# Patient Record
Sex: MALE | Race: WHITE | NOT HISPANIC OR LATINO | Employment: UNEMPLOYED | URBAN - METROPOLITAN AREA
[De-identification: names, ages, dates, MRNs, and addresses within clinical notes are randomized per-mention and may not be internally consistent; named-entity substitution may affect disease eponyms.]

---

## 2017-03-25 ENCOUNTER — HOSPITAL ENCOUNTER (EMERGENCY)
Facility: MEDICAL CENTER | Age: 22
End: 2017-03-25
Attending: EMERGENCY MEDICINE

## 2017-03-25 ENCOUNTER — OFFICE VISIT (OUTPATIENT)
Dept: URGENT CARE | Facility: PHYSICIAN GROUP | Age: 22
End: 2017-03-25

## 2017-03-25 ENCOUNTER — APPOINTMENT (OUTPATIENT)
Dept: RADIOLOGY | Facility: MEDICAL CENTER | Age: 22
End: 2017-03-25
Attending: EMERGENCY MEDICINE

## 2017-03-25 VITALS
OXYGEN SATURATION: 100 % | HEIGHT: 75 IN | DIASTOLIC BLOOD PRESSURE: 90 MMHG | TEMPERATURE: 98.8 F | RESPIRATION RATE: 16 BRPM | BODY MASS INDEX: 22.63 KG/M2 | WEIGHT: 182 LBS | HEART RATE: 61 BPM | SYSTOLIC BLOOD PRESSURE: 138 MMHG

## 2017-03-25 VITALS
OXYGEN SATURATION: 96 % | RESPIRATION RATE: 16 BRPM | HEART RATE: 75 BPM | SYSTOLIC BLOOD PRESSURE: 152 MMHG | DIASTOLIC BLOOD PRESSURE: 77 MMHG | BODY MASS INDEX: 22.71 KG/M2 | WEIGHT: 181.66 LBS | TEMPERATURE: 98.4 F

## 2017-03-25 DIAGNOSIS — K02.9 DENTAL CARIES: ICD-10-CM

## 2017-03-25 DIAGNOSIS — R22.0 FACIAL SWELLING: ICD-10-CM

## 2017-03-25 DIAGNOSIS — K04.7 TOOTH ABSCESS: ICD-10-CM

## 2017-03-25 DIAGNOSIS — K08.89 DENTALGIA: ICD-10-CM

## 2017-03-25 PROCEDURE — 96376 TX/PRO/DX INJ SAME DRUG ADON: CPT

## 2017-03-25 PROCEDURE — 96365 THER/PROPH/DIAG IV INF INIT: CPT

## 2017-03-25 PROCEDURE — 96375 TX/PRO/DX INJ NEW DRUG ADDON: CPT

## 2017-03-25 PROCEDURE — 99284 EMERGENCY DEPT VISIT MOD MDM: CPT

## 2017-03-25 PROCEDURE — 70355 PANORAMIC X-RAY OF JAWS: CPT

## 2017-03-25 PROCEDURE — 99202 OFFICE O/P NEW SF 15 MIN: CPT | Performed by: NURSE PRACTITIONER

## 2017-03-25 PROCEDURE — 700101 HCHG RX REV CODE 250: Performed by: EMERGENCY MEDICINE

## 2017-03-25 PROCEDURE — 700111 HCHG RX REV CODE 636 W/ 250 OVERRIDE (IP): Performed by: EMERGENCY MEDICINE

## 2017-03-25 RX ORDER — CLINDAMYCIN HYDROCHLORIDE 300 MG/1
300 CAPSULE ORAL 4 TIMES DAILY
Qty: 40 CAP | Refills: 0 | Status: SHIPPED | OUTPATIENT
Start: 2017-03-25 | End: 2017-04-04

## 2017-03-25 RX ORDER — IBUPROFEN 200 MG
200 TABLET ORAL EVERY 6 HOURS PRN
COMMUNITY

## 2017-03-25 RX ORDER — CLINDAMYCIN PHOSPHATE 600 MG/50ML
600 INJECTION, SOLUTION INTRAVENOUS ONCE
Status: COMPLETED | OUTPATIENT
Start: 2017-03-25 | End: 2017-03-25

## 2017-03-25 RX ORDER — OXYCODONE HYDROCHLORIDE AND ACETAMINOPHEN 5; 325 MG/1; MG/1
1-2 TABLET ORAL EVERY 4 HOURS PRN
Qty: 30 TAB | Refills: 0 | Status: SHIPPED | OUTPATIENT
Start: 2017-03-25

## 2017-03-25 RX ORDER — ONDANSETRON 2 MG/ML
4 INJECTION INTRAMUSCULAR; INTRAVENOUS ONCE
Status: COMPLETED | OUTPATIENT
Start: 2017-03-25 | End: 2017-03-25

## 2017-03-25 RX ADMIN — ONDANSETRON 4 MG: 2 INJECTION, SOLUTION INTRAMUSCULAR; INTRAVENOUS at 17:45

## 2017-03-25 RX ADMIN — HYDROMORPHONE HYDROCHLORIDE 0.5 MG: 1 INJECTION, SOLUTION INTRAMUSCULAR; INTRAVENOUS; SUBCUTANEOUS at 17:44

## 2017-03-25 RX ADMIN — CLINDAMYCIN IN 5 PERCENT DEXTROSE 600 MG: 12 INJECTION, SOLUTION INTRAVENOUS at 17:49

## 2017-03-25 RX ADMIN — HYDROMORPHONE HYDROCHLORIDE 1 MG: 1 INJECTION, SOLUTION INTRAMUSCULAR; INTRAVENOUS; SUBCUTANEOUS at 18:25

## 2017-03-25 ASSESSMENT — ENCOUNTER SYMPTOMS
CHILLS: 1
FEVER: 0
WEAKNESS: 0
SHORTNESS OF BREATH: 0
DIAPHORESIS: 0

## 2017-03-25 ASSESSMENT — PAIN SCALES - GENERAL
PAINLEVEL_OUTOF10: 10
PAINLEVEL_OUTOF10: 10

## 2017-03-25 NOTE — ED NOTES
.  Chief Complaint   Patient presents with   • Oral Swelling     x 2 days left lower jaw   • Dental Injury     x 4 days, broken tooth left lower     Ambulated to triage with above c.c.

## 2017-03-25 NOTE — ED AVS SNAPSHOT
3/25/2017          Buzz Hernández  [address]  [address]  [place] [address]    Dear Buzz:    [facility] wants to ensure your discharge home is safe and you or your loved ones have had all your questions answered regarding your care after you leave the hospital.    You may receive a telephone call within two days of your discharge.  This call is to make certain you understand your discharge instructions as well as ensure we provided you with the best care possible during your stay with us.     The call will only last approximately 3-5 minutes and will be done by a nurse.    Once again, we want to ensure your discharge home is safe and that you have a clear understanding of any next steps in your care.  If you have any questions or concerns, please do not hesitate to contact us, we are here for you.  Thank you for choosing [facility] for your healthcare needs.    Sincerely,    Venkat Ahuja    [facility]

## 2017-03-25 NOTE — ED AVS SNAPSHOT
Home Care Instructions                                                                                                                Buzz Hernández   MRN: [id number]    Department:  [facility], Emergency Dept   Date of Visit:  3/25/2017            [facility], Emergency Dept    [address]    [place] [address]    Phone:  [phone]      You were seen by     Carlos Oliveira M.D.      Your Diagnosis Was     Dentalgia     K08.89       These are the medications you received during your hospitalization from 03/25/2017 1422 to 03/25/2017 1920     Date/Time Order Dose Route Action    03/25/2017 1745 ondansetron (ZOFRAN) syringe/vial injection 4 mg 4 mg Intravenous Given    03/25/2017 1744 HYDROmorphone (DILAUDID) injection 0.5 mg 0.5 mg Intravenous Given    03/25/2017 1749 clindamycin (CLEOCIN) IVPB premix 600 mg 600 mg Intravenous New Bag    03/25/2017 1825 HYDROmorphone (DILAUDID) injection 1 mg 1 mg Intravenous Given      Follow-up Information     1. Schedule an appointment as soon as possible for a visit with Kyle Montgomery D.M.D.,MJoshD..    Specialty:  Oral Surgery    Contact information    [address]  [facility] [address]  [phone]        Medication Information     Review all of your home medications and newly ordered medications with your primary doctor and/or pharmacist as soon as possible. Follow medication instructions as directed by your doctor and/or pharmacist.     Please keep your complete medication list with you and share with your physician. Update the information when medications are discontinued, doses are changed, or new medications (including over-the-counter products) are added; and carry medication information at all times in the event of emergency situations.               Medication List      START taking these medications        Instructions    Morning Afternoon Evening Bedtime    clindamycin 300 MG Caps   Commonly known as:  CLEOCIN        Take 1 Cap by mouth 4 times a day for 10 days.   Dose:  300 mg                        oxycodone-acetaminophen 5-325 MG Tabs   Commonly known as:  PERCOCET        Take 1-2 Tabs by mouth every four hours as needed (pain).   Dose:  1-2 Tab                          ASK your doctor about these medications        Instructions    Morning Afternoon Evening Bedtime    ALEVE PO        Take  by mouth.                        ibuprofen 200 MG Tabs   Commonly known as:  MOTRIN        Take 200 mg by mouth every 6 hours as needed.   Dose:  200 mg                             Where to Get Your Medications      You can get these medications from any pharmacy     Bring a paper prescription for each of these medications    - clindamycin 300 MG Caps  - oxycodone-acetaminophen 5-325 MG Tabs            Procedures and tests performed during your visit     [id number]        Discharge Instructions       Dental Caries  Dental caries (also called tooth decay) is the most common oral disease. It can occur at any age but is more common in children and young adults.   HOW DENTAL CARIES DEVELOPS   The process of decay begins when bacteria and foods (particularly sugars and starches) combine in your mouth to produce plaque. Plaque is a substance that sticks to the hard, outer surface of a tooth (enamel). The bacteria in plaque produce acids that attack enamel. These acids may also attack the root surface of a tooth (cementum) if it is exposed. Repeated attacks dissolve these surfaces and create holes in the tooth (cavities). If left untreated, the acids destroy the other layers of the tooth.   RISK FACTORS  · Frequent sipping of sugary beverages.    · Frequent snacking on sugary and starchy foods, especially those that easily get stuck in the teeth.    · Poor oral hygiene.    · Dry mouth.    · Substance abuse such as methamphetamine abuse.    · Broken or poor-fitting dental restorations.    · Eating disorders.    · Gastroesophageal reflux  disease (GERD).    · Certain radiation treatments to the head and neck.  SYMPTOMS  In the early stages of dental caries, symptoms are seldom present. Sometimes white, chalky areas may be seen on the enamel or other tooth layers. In later stages, symptoms may include:  · Pits and holes on the enamel.  · Toothache after sweet, hot, or cold foods or drinks are consumed.  · Pain around the tooth.  · Swelling around the tooth.  DIAGNOSIS   Most of the time, dental caries is detected during a regular dental checkup. A diagnosis is made after a thorough medical and dental history is taken and the surfaces of your teeth are checked for signs of dental caries. Sometimes special instruments, such as lasers, are used to check for dental caries. Dental X-ray exams may be taken so that areas not visible to the eye (such as between the contact areas of the teeth) can be checked for cavities.   TREATMENT   If dental caries is in its early stages, it may be reversed with a fluoride treatment or an application of a remineralizing agent at the dental office. Thorough brushing and flossing at home is needed to aid these treatments. If it is in its later stages, treatment depends on the location and extent of tooth destruction:   · If a small area of the tooth has been destroyed, the destroyed area will be removed and cavities will be filled with a material such as gold, silver amalgam, or composite resin.    · If a large area of the tooth has been destroyed, the destroyed area will be removed and a cap (crown) will be fitted over the remaining tooth structure.    · If the center part of the tooth (pulp) is affected, a procedure called a root canal will be needed before a filling or crown can be placed.    · If most of the tooth has been destroyed, the tooth may need to be pulled (extracted).  HOME CARE INSTRUCTIONS  You can prevent, stop, or reverse dental caries at home by practicing good oral hygiene. Good oral hygiene  includes:  · Thoroughly cleaning your teeth at least twice a day with a toothbrush and dental floss.    · Using a fluoride toothpaste. A fluoride mouth rinse may also be used if recommended by your dentist or health care provider.    · Restricting the amount of sugary and starchy foods and sugary liquids you consume.    · Avoiding frequent snacking on these foods and sipping of these liquids.    · Keeping regular visits with a dentist for checkups and cleanings.  PREVENTION   · Practice good oral hygiene.  · Consider a dental sealant. A dental sealant is a coating material that is applied by your dentist to the pits and grooves of teeth. The sealant prevents food from being trapped in them. It may protect the teeth for several years.  · Ask about fluoride supplements if you live in a community without fluorinated water or with water that has a low fluoride content. Use fluoride supplements as directed by your dentist or health care provider.  · Allow fluoride varnish applications to teeth if directed by your dentist or health care provider.     This information is not intended to replace advice given to you by your health care provider. Make sure you discuss any questions you have with your health care provider.     Document Released: 09/09/2003 Document Revised: 01/08/2016 Document Reviewed: 12/20/2013  [employer] Interactive Patient Education ©2016 [employer]            Patient Information     Patient Information    Following emergency treatment: all patient requiring follow-up care must return either to a private physician or a clinic if your condition worsens before you are able to obtain further medical attention, please return to the emergency room.     Billing Information    At [facility], we work to make the billing process streamlined for our patients.  Our Representatives are here to answer any questions you may have regarding your hospital bill.  If you have insurance coverage and have supplied your  insurance information to us, we will submit a claim to your insurer on your behalf.  Should you have any questions regarding your bill, we can be reached online or by phone as follows:  Online: You are able pay your bills online or live chat with our representatives about any billing questions you may have. We are here to help Monday - Friday from 8:00am to 7:30pm and 9:00am - 12:00pm on Saturdays.  Please visit [email or web address]  for more information.   Phone:  [phone] or [phone]    Please note that your emergency physician, surgeon, pathologist, radiologist, anesthesiologist, and other specialists are not employed by [facility] and will therefore bill separately for their services.  Please contact them directly for any questions concerning their bills at the numbers below:     Emergency Physician Services:  [phone]  [facility]:  [phone]  [facility]:  [phone]  [facility]:  [phone]    1. Your final bill may vary from the amount quoted upon discharge if all procedures are not complete at that time, or if your doctor has additional procedures of which we are not aware. You will receive an additional bill if you return to the Emergency Department at [facility] for suture removal regardless of the facility of which the sutures were placed.     2. Please arrange for settlement of this account at the emergency registration.    3. All self-pay accounts are due in full at the time of treatment.  If you are unable to meet this obligation then payment is expected within 4-5 days.     4. If you have had radiology studies (CT, X-ray, Ultrasound, MRI), you have received a preliminary result during your emergency department visit. Please contact the radiology department [phone] to receive a copy of your final result. Please discuss the Final result with your primary physician or with the follow up  physician provided.     Crisis Hotline:  [place] Crisis Hotline:  7-691-OHOYRBU or 1-205.351.1147  [place] Crisis Hotline:    1-739.395.6058 or 054-877-4571         ED Discharge Follow Up Questions    1. In order to provide you with very good care, we would like to follow up with a phone call in the next few days.  May we have your permission to contact you?     YES /  NO    2. What is the best phone number to call you? (       )_____-__________    3. What is the best time to call you?      Morning  /  Afternoon  /  Evening                   Patient Signature:  ____________________________________________________________    Date:  ____________________________________________________________

## 2017-03-25 NOTE — ED NOTES
Patient walked to er yellow care area room 66 ready be seen  Placed patient in gown, gave call light  Family members x2 at bedside (sister and niece)

## 2017-03-25 NOTE — MR AVS SNAPSHOT
"        Buzz Hernández   3/25/2017 12:30 PM   Office Visit   MRN: [id number]    Department:  [facility]   Dept Phone:  [phone]    Description:  Male : 1995   Provider:  NATE Rosario           Reason for Visit     Dental Injury pt states that wisdom tooth broke x2days ago, swelling, left ear pain x2days       Allergies as of 3/25/2017     Not on File      You were diagnosed with     Tooth abscess   [756508]       Facial swelling   [245120]         Vital Signs     Blood Pressure Pulse Temperature Respirations Height Weight    138/90 mmHg 61 37.1 °C (98.8 °F) 16 1.905 m (6' 3\") 82.555 kg (182 lb)    Body Mass Index Oxygen Saturation Smoking Status             22.75 kg/m2 100% Current Every Day Smoker         Basic Information     Date Of Birth Sex Race Ethnicity Preferred Language    1995 Male White Non- English      Health Maintenance     Patient has no pending health maintenance at this time      Current Immunizations     No immunizations on file.      Below and/or attached are the medications your provider expects you to take. Review all of your home medications and newly ordered medications with your provider and/or pharmacist. Follow medication instructions as directed by your provider and/or pharmacist. Please keep your medication list with you and share with your provider. Update the information when medications are discontinued, doses are changed, or new medications (including over-the-counter products) are added; and carry medication information at all times in the event of emergency situations     Allergies:  No Known Allergies          Medications  Valid as of: 2017 -  1:09 PM    Generic Name Brand Name Tablet Size Instructions for use    Ibuprofen (Tab) MOTRIN 200 MG Take 200 mg by mouth every 6 hours as needed.        Naproxen Sodium   Take  by mouth.        .                 Medicines prescribed today were sent to:     [facility] - [place], " [place] - [address]    [address] [place] [address]    Phone: [phone] Fax: [phone]    Open 24 Hours?: No      Medication refill instructions:       If your prescription bottle indicates you have medication refills left, it is not necessary to call your provider’s office. Please contact your pharmacy and they will refill your medication.    If your prescription bottle indicates you do not have any refills left, you may request refills at any time through one of the following ways: The online iSentium system (except Urgent Care), by calling your provider’s office, or by asking your pharmacy to contact your provider’s office with a refill request. Medication refills are processed only during regular business hours and may not be available until the next business day. Your provider may request additional information or to have a follow-up visit with you prior to refilling your medication.   *Please Note: Medication refills are assigned a new Rx number when refilled electronically. Your pharmacy may indicate that no refills were authorized even though a new prescription for the same medication is available at the pharmacy. Please request the medicine by name with the pharmacy before contacting your provider for a refill.           i[employer]ium Access Code: Activation code not generated  Current [employer] Status: Active          Quit Tobacco Information     Do you want to quit using tobacco?    Quitting tobacco decreases risks of cancer, heart and lung disease, increases life expectancy, improves sense of taste and smell, and increases spending money, among other benefits.    If you are thinking about quitting, we can help.  • [employer] Quit Tobacco Program: 423.874.5240  o Program occurs weekly for four weeks and includes pharmacist consultation on products to support quitting smoking or chewing tobacco. A provider referral is needed for pharmacist consultation.  • Tobacco Users Help Hotline: 4-800-QUIT-NOW  (773-0091) or https://[place].quitlogix.org/  o Free, confidential telephone and online coaching for [place] residents. Sessions are designed on a schedule that is convenient for you. Eligible clients receive free nicotine replacement therapy.  • Nationally: www.smokefree.gov  o Information and professional assistance to support both immediate and long-term needs as you become, and remain, a non-smoker. Smokefree.gov allows you to choose the help that best fits your needs.

## 2017-03-25 NOTE — PROGRESS NOTES
"Subjective:      Buzz Hernández is a 21 y.o. male who presents with Dental Injury            Dental Injury  Associated symptoms include chills. Pertinent negatives include no chest pain, diaphoresis, fever or weakness.   Patient reports that his left lower wisdom tooth broke off at the gum line 2 days ago.  Since then he has noted dramatic pain and facial swelling, plus fetid breath.  He has been using OTC mouthwash with no relief of symptoms.  He denies any fever, but feels chilled.  No difficulty breathing.  He comes in to urgent care in [place] from his home in [place].  He is not established with a dentist or have access to an oral surgeon in his home town.      Review of Systems   Constitutional: Positive for chills and malaise/fatigue. Negative for fever and diaphoresis.   Respiratory: Negative for shortness of breath.    Cardiovascular: Negative for chest pain.   Neurological: Negative for weakness.     Medications, Allergies, and current problem list reviewed today in Epic     Objective:     /90 mmHg  Pulse 61  Temp(Src) 37.1 °C (98.8 °F)  Resp 16  Ht 1.905 m (6' 3\")  Wt 82.555 kg (182 lb)  BMI 22.75 kg/m2  SpO2 100%     Physical Exam   Constitutional: He appears well-developed and well-nourished. He appears distressed.   HENT:   Head:       Mouth/Throat:       Diffuse left sided mandibular region facial swelling, extending superiorly into maxillary region.  Diffuse left cervical adenopathy.  Face and neck are tender to light palpation.  No erythema of the face.  Left lower back molar is broken or decayed to the gum line with extreme tenderness on palpation.  No fluctuance of the gum noted.  Fetid breath odor of purulence detected.     Cardiovascular: Normal rate, regular rhythm and normal heart sounds.    Pulmonary/Chest: Effort normal and breath sounds normal.   Skin: He is not diaphoretic.   Vitals reviewed.              Assessment/Plan:     1. Tooth abscess     2. Facial swelling   "     Discussed treatment options with patient, including empiric antibiotics and pain management with advise that patient contact an oral surgeon for definitive treatment.  However[place] and given the extent of his symptoms, I do feel that emergency care is a better option.  ED consult may or may not include imaging, blood work, pain management, antibiotics, and possible hospitalization or consult/care with oral surgeon.  Patient verbalized understanding of and agreed with plan of care.  He will travel via private car.  Call reported to [facility] ED: Dr. Wilburn.

## 2017-03-25 NOTE — ED AVS SNAPSHOT
[employer] Access Code: Activation code not generated  Current [employer] Status: Active    Your email address is not on file at [facility].  Email Addresses are required for you to sign up for [employer], please contact [phone] to verify your personal information and to provide your email address prior to attempting to register for [employer].    Buzz Hernández  [address]   [place] [address]    [employer]  A secure, online tool to manage your health information     [facility]’s [employer]® is a secure, online tool that connects you to your personalized health information from the privacy of your home -- day or night - making it very easy for you to manage your healthcare. Once the activation process is completed, you can even access your medical information using the [employer] day, which is available for free in the Apple Day store or Google Play store.     To learn more about [employer], visit [email or web address]    There are two levels of access available (as shown below):   My Chart Features  [facility] Primary Care Doctor [facility]  Specialists [facility]  Urgent  Care Non-[facility] Primary Care Doctor   Email your healthcare team securely and privately 24/7 X X X    Manage appointments: schedule your next appointment; view details of past/upcoming appointments X      Request prescription refills. X      View recent personal medical records, including lab and immunizations X X X X   View health record, including health history, allergies, medications X X X X   Read reports about your outpatient visits, procedures, consult and ER notes X X X X   See your discharge summary, which is a recap of your hospital and/or ER visit that includes your diagnosis, lab results, and care plan X X  X     How to register for [employer]:  Once your e-mail address has been verified, follow the following steps to sign up for [employer].     1. Go to  [email or web address]  2. Click on the Sign Up Now box, which takes you to the New  Member Sign Up page. You will need to provide the following information:  a. Enter your [employer] Access Code exactly as it appears at the top of this page. (You will not need to use this code after you’ve completed the sign-up process. If you do not sign up before the expiration date, you must request a new code.)   b. Enter your date of birth.   c. Enter your home email address.   d. Click Submit, and follow the next screen’s instructions.  3. Create a [employer] ID. This will be your [employer] login ID and cannot be changed, so think of one that is secure and easy to remember.  4. Create a [employer] password. You can change your password at any time.  5. Enter your Password Reset Question and Answer. This can be used at a later time if you forget your password.   6. Enter your e-mail address. This allows you to receive e-mail notifications when new information is available in [employer].  7. Click Sign Up. You can now view your health information.    For assistance activating your [employer] account, call [phone]

## 2017-03-26 NOTE — DISCHARGE INSTRUCTIONS
Dental Caries  Dental caries (also called tooth decay) is the most common oral disease. It can occur at any age but is more common in children and young adults.   HOW DENTAL CARIES DEVELOPS   The process of decay begins when bacteria and foods (particularly sugars and starches) combine in your mouth to produce plaque. Plaque is a substance that sticks to the hard, outer surface of a tooth (enamel). The bacteria in plaque produce acids that attack enamel. These acids may also attack the root surface of a tooth (cementum) if it is exposed. Repeated attacks dissolve these surfaces and create holes in the tooth (cavities). If left untreated, the acids destroy the other layers of the tooth.   RISK FACTORS  · Frequent sipping of sugary beverages.    · Frequent snacking on sugary and starchy foods, especially those that easily get stuck in the teeth.    · Poor oral hygiene.    · Dry mouth.    · Substance abuse such as methamphetamine abuse.    · Broken or poor-fitting dental restorations.    · Eating disorders.    · Gastroesophageal reflux disease (GERD).    · Certain radiation treatments to the head and neck.  SYMPTOMS  In the early stages of dental caries, symptoms are seldom present. Sometimes white, chalky areas may be seen on the enamel or other tooth layers. In later stages, symptoms may include:  · Pits and holes on the enamel.  · Toothache after sweet, hot, or cold foods or drinks are consumed.  · Pain around the tooth.  · Swelling around the tooth.  DIAGNOSIS   Most of the time, dental caries is detected during a regular dental checkup. A diagnosis is made after a thorough medical and dental history is taken and the surfaces of your teeth are checked for signs of dental caries. Sometimes special instruments, such as lasers, are used to check for dental caries. Dental X-ray exams may be taken so that areas not visible to the eye (such as between the contact areas of the teeth) can be checked for cavities.    TREATMENT   If dental caries is in its early stages, it may be reversed with a fluoride treatment or an application of a remineralizing agent at the dental office. Thorough brushing and flossing at home is needed to aid these treatments. If it is in its later stages, treatment depends on the location and extent of tooth destruction:   · If a small area of the tooth has been destroyed, the destroyed area will be removed and cavities will be filled with a material such as gold, silver amalgam, or composite resin.    · If a large area of the tooth has been destroyed, the destroyed area will be removed and a cap (crown) will be fitted over the remaining tooth structure.    · If the center part of the tooth (pulp) is affected, a procedure called a root canal will be needed before a filling or crown can be placed.    · If most of the tooth has been destroyed, the tooth may need to be pulled (extracted).  HOME CARE INSTRUCTIONS  You can prevent, stop, or reverse dental caries at home by practicing good oral hygiene. Good oral hygiene includes:  · Thoroughly cleaning your teeth at least twice a day with a toothbrush and dental floss.    · Using a fluoride toothpaste. A fluoride mouth rinse may also be used if recommended by your dentist or health care provider.    · Restricting the amount of sugary and starchy foods and sugary liquids you consume.    · Avoiding frequent snacking on these foods and sipping of these liquids.    · Keeping regular visits with a dentist for checkups and cleanings.  PREVENTION   · Practice good oral hygiene.  · Consider a dental sealant. A dental sealant is a coating material that is applied by your dentist to the pits and grooves of teeth. The sealant prevents food from being trapped in them. It may protect the teeth for several years.  · Ask about fluoride supplements if you live in a community without fluorinated water or with water that has a low fluoride content. Use fluoride supplements  as directed by your dentist or health care provider.  · Allow fluoride varnish applications to teeth if directed by your dentist or health care provider.     This information is not intended to replace advice given to you by your health care provider. Make sure you discuss any questions you have with your health care provider.     Document Released: 09/09/2003 Document Revised: 01/08/2016 Document Reviewed: 12/20/2013  [employer] Interactive Patient Education ©2016 [employer]

## 2017-03-26 NOTE — ED PROVIDER NOTES
ED Provider Note    CHIEF COMPLAINT  Chief Complaint   Patient presents with   • Oral Swelling     x 2 days left lower jaw   • Dental Injury     x 4 days, broken tooth left lower       HPI  Buzz Hernández is a 21 y.o. male who presents for evaluation of dental pain. He states he has a bad left lower molar. He states it has had a cavity and then it fractured 4 days ago. He's had 2 days of swelling of the left lower jaw. He states he has not slept in 2 days because of the pain. He has difficulty opening his mouth because of the pain. He has no dentist. I believe he was seen in [place] and referred here to have his tooth pulled.    REVIEW OF SYSTEMS  See HPI for further details. All other systems are negative.     PAST MEDICAL HISTORY  No past medical history on file.    FAMILY HISTORY  No family history on file.    SOCIAL HISTORY  Social History     Social History   • Marital Status: Unknown     Spouse Name: N/A   • Number of Children: N/A   • Years of Education: N/A     Social History Main Topics   • Smoking status: Current Every Day Smoker -- 0.50 packs/day     Types: Cigarettes   • Smokeless tobacco: Never Used   • Alcohol Use: Yes      Comment: rare   • Drug Use: Yes     Special: Marijuana   • Sexual Activity: Not on file     Other Topics Concern   • Not on file     Social History Narrative   • No narrative on file       SURGICAL HISTORY  No past surgical history on file.    CURRENT MEDICATIONS  Home Medications     **Home medications have not yet been reviewed for this encounter**          ALLERGIES  No Known Allergies    PHYSICAL EXAM  VITAL SIGNS: /77 mmHg  Pulse 74  Temp(Src) 36.7 °C (98.1 °F)  Resp 14  Wt 82.4 kg (181 lb 10.5 oz)  SpO2 100%    Constitutional: Well developed, Well nourished.   HENT: Normocephalic, swelling to the left mandible region. Significant trismus. He has a left lower molar that is fractured off an eroded away down to the gumline. Floor of the mouth seems soft.  Eyes:   EOMI, Conjunctiva normal, No discharge.   Neck: Normal range of motion. No stridor.  Cardiovascular: Normal heart rate.   Thorax & Lungs: No respiratory distress.  Skin: Warm, Dry.   Musculoskeletal: Good range of motion in all major joints.  Neurologic: Awake alert.    RADIOLOGY/PROCEDURES  [id number]   Final Result      1.  No acute mandibular fracture or dislocation.      2.  Dental caries involving the mandibular molars bilaterally.            COURSE & MEDICAL DECISION MAKING  Pertinent Labs & Imaging studies reviewed. (See chart for details)  This is a 21-year-old here for evaluation of dental pain. He does have left mandibular swelling with some trismus. He has generalized rather poor dentition but no focal gum swelling the floor the mouth is soft. IV is established and the patient is treated with Dilaudid and Zofran with improvement. He is treated with clindamycin IV as well. Panorex shows no evidence of fracture and no evidence of apical abscess is. He does have multiple dental caries noted. I discussed results of the x-ray with the patient and his family. There is no indication for emergent oral surgical intervention. They live in [place]. I will refer them to Dr. Kati Alex who is on-call for oral surgery. I've also provided them with our dental referral sheet. These provided a prescription for Percocet and clindamycin. I reviewed his prescription monitoring record. He is given a discharge instruction sheet on dental caries.    FINAL IMPRESSION  1. Acute dentalgia  2. Dental caries  3.         Electronically signed by: Carlso Oliveira, 3/25/2017 5:08 PM